# Patient Record
Sex: FEMALE | URBAN - METROPOLITAN AREA
[De-identification: names, ages, dates, MRNs, and addresses within clinical notes are randomized per-mention and may not be internally consistent; named-entity substitution may affect disease eponyms.]

---

## 2020-11-02 ENCOUNTER — TELEPHONE (OUTPATIENT)
Dept: ONCOLOGY | Age: 65
End: 2020-11-02

## 2020-11-06 ENCOUNTER — TELEPHONE (OUTPATIENT)
Dept: ONCOLOGY | Age: 65
End: 2020-11-06

## 2020-11-17 ENCOUNTER — TELEPHONE (OUTPATIENT)
Dept: ONCOLOGY | Age: 65
End: 2020-11-17

## 2020-11-17 NOTE — TELEPHONE ENCOUNTER
Spoke with Ailyn's son Pedro Perez. I asked if he was able to get his mom scheduled with medical oncologist Dr. Jerilyn Hawkins. He relates he has not. I let him know I will contact office now and try to get her scheduled. He asks what type of doctor this is. I explained that Dr. Jerilyn Hawkins is a medical oncologist and would be her cancer doctor following her with regards to her previous diagnosis of breast cancer and surveillance. Pedro Perez relates he is unsure if his mom would prefer a female doctor. He would like to talk to his mom about that. Grecia Angi know he can call me after he discusses with his mom and let me know and we will move forward, we can schedule her if she is agreeable or we can let him know of a female medical oncologist in the area. I am happy to assist with what ever they choose.

## 2021-04-05 ENCOUNTER — TELEPHONE (OUTPATIENT)
Dept: ONCOLOGY | Age: 66
End: 2021-04-05

## 2021-04-05 NOTE — TELEPHONE ENCOUNTER
Spoke with patient's son Tunde Garcia several times about getting his mother set up with Dr. Dimitri Santiago for medical oncology follow up. Noted today that patient has not been seen by our medical oncology. I called and spoke with Raj again. He relates that his mom has not been set up with medical oncology as of yet. I offer to assist him as needed. He relates that his mom would like to follow with a female physician. He asked if we had any female medical oncologists. I let him know that unfortunetly we do not have a female medical oncologist at this time. I did let him know about Dr. Javier Navarro. I let him know she is a female medical oncologist in the [address] [place] [address] oncology group. I let him know she is a very good doctor and I gave him the [facility] office number to call. Explained that they have various offices. He thanked me. I let him know he can call me for assistance as needed. I will sign off case.